# Patient Record
Sex: FEMALE | Race: WHITE | NOT HISPANIC OR LATINO | Employment: OTHER | URBAN - METROPOLITAN AREA
[De-identification: names, ages, dates, MRNs, and addresses within clinical notes are randomized per-mention and may not be internally consistent; named-entity substitution may affect disease eponyms.]

---

## 2017-07-11 ENCOUNTER — TRANSCRIBE ORDERS (OUTPATIENT)
Dept: ADMINISTRATIVE | Facility: HOSPITAL | Age: 82
End: 2017-07-11

## 2017-07-11 DIAGNOSIS — Z12.31 VISIT FOR SCREENING MAMMOGRAM: Primary | ICD-10-CM

## 2017-08-10 ENCOUNTER — HOSPITAL ENCOUNTER (OUTPATIENT)
Dept: MAMMOGRAPHY | Facility: HOSPITAL | Age: 82
Discharge: HOME | End: 2017-08-10
Admitting: INTERNAL MEDICINE

## 2017-08-10 DIAGNOSIS — Z12.31 VISIT FOR SCREENING MAMMOGRAM: ICD-10-CM

## 2017-08-10 PROCEDURE — G0202 SCR MAMMO BI INCL CAD: HCPCS

## 2017-08-10 PROCEDURE — 77063 BREAST TOMOSYNTHESIS BI: CPT | Performed by: RADIOLOGY

## 2017-08-10 PROCEDURE — 77063 BREAST TOMOSYNTHESIS BI: CPT

## 2017-08-10 PROCEDURE — G0202 SCR MAMMO BI INCL CAD: HCPCS | Performed by: RADIOLOGY

## 2018-06-28 ENCOUNTER — TRANSCRIBE ORDERS (OUTPATIENT)
Dept: ADMINISTRATIVE | Facility: HOSPITAL | Age: 83
End: 2018-06-28

## 2018-06-28 DIAGNOSIS — Z12.31 VISIT FOR SCREENING MAMMOGRAM: Primary | ICD-10-CM

## 2018-08-13 ENCOUNTER — HOSPITAL ENCOUNTER (OUTPATIENT)
Dept: MAMMOGRAPHY | Facility: HOSPITAL | Age: 83
Discharge: HOME | End: 2018-08-13
Admitting: INTERNAL MEDICINE

## 2018-08-13 DIAGNOSIS — Z12.31 VISIT FOR SCREENING MAMMOGRAM: ICD-10-CM

## 2018-08-13 PROCEDURE — 77067 SCR MAMMO BI INCL CAD: CPT

## 2018-08-13 PROCEDURE — 77063 BREAST TOMOSYNTHESIS BI: CPT

## 2018-08-13 PROCEDURE — 77067 SCR MAMMO BI INCL CAD: CPT | Performed by: RADIOLOGY

## 2018-08-13 PROCEDURE — 77063 BREAST TOMOSYNTHESIS BI: CPT | Performed by: RADIOLOGY

## 2019-07-29 ENCOUNTER — TRANSCRIBE ORDERS (OUTPATIENT)
Dept: ADMINISTRATIVE | Facility: HOSPITAL | Age: 84
End: 2019-07-29

## 2019-07-29 DIAGNOSIS — Z12.31 VISIT FOR SCREENING MAMMOGRAM: Primary | ICD-10-CM

## 2019-09-24 ENCOUNTER — HOSPITAL ENCOUNTER (OUTPATIENT)
Dept: MAMMOGRAPHY | Facility: HOSPITAL | Age: 84
Discharge: HOME | End: 2019-09-24
Admitting: INTERNAL MEDICINE

## 2019-09-24 DIAGNOSIS — Z12.31 VISIT FOR SCREENING MAMMOGRAM: ICD-10-CM

## 2019-09-24 PROCEDURE — 77067 SCR MAMMO BI INCL CAD: CPT

## 2019-09-24 PROCEDURE — 77063 BREAST TOMOSYNTHESIS BI: CPT

## 2019-09-24 PROCEDURE — 77067 SCR MAMMO BI INCL CAD: CPT | Performed by: RADIOLOGY

## 2019-09-24 PROCEDURE — 77063 BREAST TOMOSYNTHESIS BI: CPT | Performed by: RADIOLOGY

## 2020-08-13 ENCOUNTER — TRANSCRIBE ORDERS (OUTPATIENT)
Dept: ADMINISTRATIVE | Facility: HOSPITAL | Age: 85
End: 2020-08-13

## 2020-08-13 DIAGNOSIS — Z12.31 VISIT FOR SCREENING MAMMOGRAM: Primary | ICD-10-CM

## 2020-10-03 ENCOUNTER — HOSPITAL ENCOUNTER (OUTPATIENT)
Dept: MAMMOGRAPHY | Facility: HOSPITAL | Age: 85
Discharge: HOME | End: 2020-10-03
Admitting: INTERNAL MEDICINE

## 2020-10-03 DIAGNOSIS — Z12.31 VISIT FOR SCREENING MAMMOGRAM: ICD-10-CM

## 2020-10-03 PROCEDURE — 77067 SCR MAMMO BI INCL CAD: CPT | Performed by: RADIOLOGY

## 2020-10-03 PROCEDURE — 77063 BREAST TOMOSYNTHESIS BI: CPT

## 2020-10-03 PROCEDURE — 77067 SCR MAMMO BI INCL CAD: CPT

## 2020-10-03 PROCEDURE — 77063 BREAST TOMOSYNTHESIS BI: CPT | Performed by: RADIOLOGY

## 2020-12-28 ENCOUNTER — APPOINTMENT (OUTPATIENT)
Dept: MAMMOGRAPHY | Facility: HOSPITAL | Age: 85
End: 2020-12-28

## 2021-01-04 ENCOUNTER — HOSPITAL ENCOUNTER (OUTPATIENT)
Dept: MAMMOGRAPHY | Facility: HOSPITAL | Age: 86
Discharge: HOME | End: 2021-01-04

## 2021-01-04 ENCOUNTER — HOSPITAL ENCOUNTER (OUTPATIENT)
Dept: ULTRASOUND IMAGING | Facility: HOSPITAL | Age: 86
Discharge: HOME | End: 2021-01-04

## 2021-01-04 ENCOUNTER — DOCUMENTATION (OUTPATIENT)
Dept: MAMMOGRAPHY | Facility: HOSPITAL | Age: 86
End: 2021-01-04

## 2021-01-04 DIAGNOSIS — R92.8 ABNORMAL MAMMOGRAM: ICD-10-CM

## 2021-01-04 PROCEDURE — 77065 DX MAMMO INCL CAD UNI: CPT | Performed by: RADIOLOGY

## 2021-01-04 PROCEDURE — G0279 TOMOSYNTHESIS, MAMMO: HCPCS | Performed by: RADIOLOGY

## 2021-01-04 PROCEDURE — 76642 ULTRASOUND BREAST LIMITED: CPT | Performed by: RADIOLOGY

## 2021-01-04 PROCEDURE — G0279 TOMOSYNTHESIS, MAMMO: HCPCS

## 2021-01-04 PROCEDURE — 76642 ULTRASOUND BREAST LIMITED: CPT

## 2021-01-04 PROCEDURE — 77065 DX MAMMO INCL CAD UNI: CPT

## 2021-01-04 NOTE — PROGRESS NOTES
Antithrombolic/blood thinner clearance form successfully faxed to Karen Quesada, DO at pt request.

## 2021-01-06 ENCOUNTER — TELEPHONE (OUTPATIENT)
Dept: MAMMOGRAPHY | Facility: HOSPITAL | Age: 86
End: 2021-01-06

## 2021-01-07 ENCOUNTER — TRANSCRIBE ORDERS (OUTPATIENT)
Dept: MAMMOGRAPHY | Facility: HOSPITAL | Age: 86
End: 2021-01-07

## 2021-01-07 DIAGNOSIS — R92.8 ABNORMAL MAMMOGRAM: Primary | ICD-10-CM

## 2021-01-15 ENCOUNTER — TELEPHONE (OUTPATIENT)
Dept: MAMMOGRAPHY | Facility: HOSPITAL | Age: 86
End: 2021-01-15

## 2021-01-15 NOTE — TELEPHONE ENCOUNTER
Returned patient call; questions answered regarding medication instructions prior to & after procedure scheduled on 1/25/21. Questions answered, support given. Pt is to contact prescribing provider with any additional medication questions. Pt verbalized understanding.

## 2021-01-25 ENCOUNTER — HOSPITAL ENCOUNTER (OUTPATIENT)
Dept: ULTRASOUND IMAGING | Facility: HOSPITAL | Age: 86
Discharge: HOME | End: 2021-01-25

## 2021-01-25 ENCOUNTER — HOSPITAL ENCOUNTER (OUTPATIENT)
Dept: MAMMOGRAPHY | Facility: HOSPITAL | Age: 86
Discharge: HOME | End: 2021-01-25

## 2021-01-25 DIAGNOSIS — R92.8 ABNORMAL MAMMOGRAM: ICD-10-CM

## 2021-01-25 PROCEDURE — 88341 IMHCHEM/IMCYTCHM EA ADD ANTB: CPT | Performed by: INTERNAL MEDICINE

## 2021-01-25 PROCEDURE — 88305 TISSUE EXAM BY PATHOLOGIST: CPT | Performed by: INTERNAL MEDICINE

## 2021-01-25 PROCEDURE — 88342 IMHCHEM/IMCYTCHM 1ST ANTB: CPT | Performed by: INTERNAL MEDICINE

## 2021-01-25 PROCEDURE — A4648 IMPLANTABLE TISSUE MARKER: HCPCS

## 2021-01-25 PROCEDURE — 88360 TUMOR IMMUNOHISTOCHEM/MANUAL: CPT | Performed by: INTERNAL MEDICINE

## 2021-01-25 PROCEDURE — 25010000003 LIDOCAINE 1 % SOLUTION: Performed by: RADIOLOGY

## 2021-01-25 PROCEDURE — 77065 DX MAMMO INCL CAD UNI: CPT | Performed by: RADIOLOGY

## 2021-01-25 PROCEDURE — 19083 BX BREAST 1ST LESION US IMAG: CPT | Performed by: RADIOLOGY

## 2021-01-25 RX ORDER — LIDOCAINE HYDROCHLORIDE 10 MG/ML
5 INJECTION, SOLUTION INFILTRATION; PERINEURAL ONCE
Status: COMPLETED | OUTPATIENT
Start: 2021-01-25 | End: 2021-01-25

## 2021-01-25 RX ORDER — LIDOCAINE HYDROCHLORIDE AND EPINEPHRINE 10; 10 MG/ML; UG/ML
10 INJECTION, SOLUTION INFILTRATION; PERINEURAL ONCE
Status: COMPLETED | OUTPATIENT
Start: 2021-01-25 | End: 2021-01-25

## 2021-01-25 RX ADMIN — LIDOCAINE HYDROCHLORIDE 2 ML: 10 INJECTION, SOLUTION INFILTRATION; PERINEURAL at 15:01

## 2021-01-25 RX ADMIN — LIDOCAINE HYDROCHLORIDE,EPINEPHRINE BITARTRATE 9 ML: 10; .01 INJECTION, SOLUTION INFILTRATION; PERINEURAL at 15:02

## 2021-01-28 ENCOUNTER — TELEPHONE (OUTPATIENT)
Dept: MAMMOGRAPHY | Facility: HOSPITAL | Age: 86
End: 2021-01-28

## 2021-01-28 LAB
CYTO UR: NORMAL
LAB AP CASE REPORT: NORMAL
LAB AP CLINICAL INFORMATION: NORMAL
LAB AP DIAGNOSIS COMMENT: NORMAL
LAB AP SPECIAL STAINS: NORMAL
PATH REPORT.FINAL DX SPEC: NORMAL
PATH REPORT.GROSS SPEC: NORMAL

## 2021-01-28 NOTE — TELEPHONE ENCOUNTER
Patient notified of appointment with Dr Bailey for surgical consult - 2.10.21 @ [id number]/[id number].  Pt given office contact & location information. Told to bring photo ID, list of prescription & OTC medications, insurance information, must wear a mask & can bring one person for support also wearing a mask. Reviewed what would be discussed at surgical consult visit, including detailed explanation of pathology report & imaging reports; treatment options & pros/cons, availability of nurse navigator. Patient encouraged to call back or contact NIMISHA Espino RN, OCN, Breast Navigator, with any questions or concerns. Pt information sent to NIMISHA Espino RN, OCN, Breast Navigator for evaluation & referral for possible genetic counseling. Breast cancer information packet offered and accepted. Patient verbalized understanding.

## 2021-01-28 NOTE — TELEPHONE ENCOUNTER
Referring provider's office notified pathology returned as cancer & patient will be notified. Pt notified of pathology results and recommendation. Verbalizes understanding. Denies discomfort. Denies signs and symptoms of infection.   Patient desires Dr Bailey for surgical consult. Patient will be notified of appointment. Patient verbalized understanding.

## 2021-02-11 ENCOUNTER — TRANSCRIBE ORDERS (OUTPATIENT)
Dept: MAMMOGRAPHY | Facility: HOSPITAL | Age: 86
End: 2021-02-11

## 2021-02-11 ENCOUNTER — NURSE NAVIGATOR (OUTPATIENT)
Dept: OTHER | Facility: HOSPITAL | Age: 86
End: 2021-02-11

## 2021-02-11 DIAGNOSIS — C50.411 MALIGNANT NEOPLASM OF UPPER-OUTER QUADRANT OF RIGHT BREAST IN FEMALE, ESTROGEN RECEPTOR POSITIVE (HCC): Primary | ICD-10-CM

## 2021-02-11 DIAGNOSIS — Z17.0 MALIGNANT NEOPLASM OF UPPER-OUTER QUADRANT OF RIGHT BREAST IN FEMALE, ESTROGEN RECEPTOR POSITIVE (HCC): Primary | ICD-10-CM

## 2021-02-11 DIAGNOSIS — R92.8 ABNORMAL MAMMOGRAM: Primary | ICD-10-CM

## 2021-02-11 NOTE — PROGRESS NOTES
Saw patient and son with Dr. OLIVERA regarding her new R Stage 1A LG IDC ER/HI positive Her 2 patricia negative breast cancer. Path report and surgical options discussed. MRI will be ordered. SOZO completed. Educational material provided and discussed. All questions were answered and she will call with any new concerns.

## 2021-02-15 ENCOUNTER — HOSPITAL ENCOUNTER (OUTPATIENT)
Dept: MRI IMAGING | Facility: HOSPITAL | Age: 86
Discharge: HOME | End: 2021-02-15

## 2021-02-15 DIAGNOSIS — C50.411 MALIGNANT NEOPLASM OF UPPER-OUTER QUADRANT OF RIGHT FEMALE BREAST (HCC): ICD-10-CM

## 2021-02-15 LAB — CREAT BLDA-MCNC: 1.6 MG/DL (ref 0.6–1.3)

## 2021-02-15 PROCEDURE — 82565 ASSAY OF CREATININE: CPT

## 2021-02-16 ENCOUNTER — APPOINTMENT (OUTPATIENT)
Dept: MRI IMAGING | Facility: HOSPITAL | Age: 86
End: 2021-02-16

## 2021-02-18 ENCOUNTER — TRANSCRIBE ORDERS (OUTPATIENT)
Dept: PREADMISSION TESTING | Facility: HOSPITAL | Age: 86
End: 2021-02-18

## 2021-02-18 ENCOUNTER — TRANSCRIBE ORDERS (OUTPATIENT)
Dept: ADMINISTRATIVE | Facility: HOSPITAL | Age: 86
End: 2021-02-18

## 2021-02-18 DIAGNOSIS — C50.411 MALIGNANT NEOPLASM OF UPPER-OUTER QUADRANT OF RIGHT FEMALE BREAST, UNSPECIFIED ESTROGEN RECEPTOR STATUS (HCC): Primary | ICD-10-CM

## 2021-02-19 ENCOUNTER — PRE-ADMISSION TESTING (OUTPATIENT)
Dept: PREADMISSION TESTING | Facility: HOSPITAL | Age: 86
End: 2021-02-19

## 2021-02-19 ENCOUNTER — APPOINTMENT (OUTPATIENT)
Dept: MAMMOGRAPHY | Facility: HOSPITAL | Age: 86
End: 2021-02-19

## 2021-02-19 LAB
ALBUMIN SERPL-MCNC: 4.5 G/DL (ref 3.5–5.2)
ALBUMIN/GLOB SERPL: 1.9 G/DL
ALP SERPL-CCNC: 95 U/L (ref 39–117)
ALT SERPL W P-5'-P-CCNC: 11 U/L (ref 1–33)
ANION GAP SERPL CALCULATED.3IONS-SCNC: 12 MMOL/L (ref 5–15)
AST SERPL-CCNC: 17 U/L (ref 1–32)
BILIRUB SERPL-MCNC: 0.6 MG/DL (ref 0–1.2)
BUN SERPL-MCNC: 22 MG/DL (ref 8–23)
BUN/CREAT SERPL: 12.2 (ref 7–25)
CALCIUM SPEC-SCNC: 9.8 MG/DL (ref 8.6–10.5)
CHLORIDE SERPL-SCNC: 98 MMOL/L (ref 98–107)
CO2 SERPL-SCNC: 24 MMOL/L (ref 22–29)
CREAT SERPL-MCNC: 1.81 MG/DL (ref 0.57–1)
DEPRECATED RDW RBC AUTO: 47.7 FL (ref 37–54)
ERYTHROCYTE [DISTWIDTH] IN BLOOD BY AUTOMATED COUNT: 14.1 % (ref 12.3–15.4)
GFR SERPL CREATININE-BSD FRML MDRD: 27 ML/MIN/1.73
GLOBULIN UR ELPH-MCNC: 2.4 GM/DL
GLUCOSE SERPL-MCNC: 111 MG/DL (ref 65–99)
HCT VFR BLD AUTO: 41.9 % (ref 34–46.6)
HGB BLD-MCNC: 13.3 G/DL (ref 12–15.9)
MCH RBC QN AUTO: 29.4 PG (ref 26.6–33)
MCHC RBC AUTO-ENTMCNC: 31.7 G/DL (ref 31.5–35.7)
MCV RBC AUTO: 92.5 FL (ref 79–97)
PLATELET # BLD AUTO: 296 10*3/MM3 (ref 140–450)
PMV BLD AUTO: 11.1 FL (ref 6–12)
POTASSIUM SERPL-SCNC: 4.2 MMOL/L (ref 3.5–5.2)
PROT SERPL-MCNC: 6.9 G/DL (ref 6–8.5)
QT INTERVAL: 366 MS
QTC INTERVAL: 447 MS
RBC # BLD AUTO: 4.53 10*6/MM3 (ref 3.77–5.28)
SODIUM SERPL-SCNC: 134 MMOL/L (ref 136–145)
WBC # BLD AUTO: 8.4 10*3/MM3 (ref 3.4–10.8)

## 2021-02-19 PROCEDURE — 93005 ELECTROCARDIOGRAM TRACING: CPT

## 2021-02-19 PROCEDURE — 36415 COLL VENOUS BLD VENIPUNCTURE: CPT

## 2021-02-19 PROCEDURE — 80053 COMPREHEN METABOLIC PANEL: CPT

## 2021-02-19 PROCEDURE — 93010 ELECTROCARDIOGRAM REPORT: CPT | Performed by: INTERNAL MEDICINE

## 2021-02-19 PROCEDURE — 85027 COMPLETE CBC AUTOMATED: CPT

## 2021-02-19 NOTE — DISCHARGE INSTRUCTIONS
The following information and instructions were given:    Nothing to eat or drink after midnight except sips of water with routine prescribed medication (except blood thinner, certain blood pressure medications, diabetes, or weight reducing medication) unless otherwise instructed by your physician.  Do not eat, drink, smoke or chew gum after midnight the night before surgery. This also includes no mints.    EXCEPTION: ERAS patients Patient instructed to drink 20 ounces (or until full) of Gatorade and it needs to be completed 1 hour before given arrival time on the day of surgery. (NO RED Gatorade)    Patient verbalized understanding.      DO NOT shave for two days before your procedure.  Do not wear makeup.      DO NOT wear fingernail polish (gel/regular) and/or acrylic/artificial nails on the day of surgery.   If a patient had recent manicure and would rather not remove polish or artificial nails, then the minimum requirement is that the polish/artificial nails must be removed from the middle finger on each hand.      If patient was having surgery on an upper extremity, then the patient was instructed that fingernail polish/artificial fingernails must be removed for surgery.  NO EXCEPTIONS.      If patient was having surgery on a lower extremity, then the patient was instructed that toenail polish on both extremities must be removed for surgery.  NO EXCEPTIONS.    Remove all jewelry (advised to go to jeweler if unable to remove).  Jewelry especially rings can no longer be taped for surgery.    Leave anything you consider valuable at home.      Bring the following with you (if applicable)   -picture ID and insurance cards   -Co-pay/deductible required by insurance   -Medications in the original bottles (not a list) including all over-the-counter meds     Education booklet, brochure, and/or given to patient.    Patient must have a  for transportation home after procedure.  It must be an   adult that will take  responsibility for care for 24 hours after surgery.

## 2021-02-21 ENCOUNTER — APPOINTMENT (OUTPATIENT)
Dept: PREADMISSION TESTING | Facility: HOSPITAL | Age: 86
End: 2021-02-21

## 2021-02-21 PROCEDURE — C9803 HOPD COVID-19 SPEC COLLECT: HCPCS

## 2021-02-21 PROCEDURE — U0004 COV-19 TEST NON-CDC HGH THRU: HCPCS

## 2021-02-22 LAB — SARS-COV-2 RNA RESP QL NAA+PROBE: NOT DETECTED

## 2021-02-23 ENCOUNTER — HOSPITAL ENCOUNTER (OUTPATIENT)
Dept: ULTRASOUND IMAGING | Facility: HOSPITAL | Age: 86
Discharge: HOME | End: 2021-02-23

## 2021-02-23 ENCOUNTER — HOSPITAL ENCOUNTER (OUTPATIENT)
Dept: MAMMOGRAPHY | Facility: HOSPITAL | Age: 86
Discharge: HOME | End: 2021-02-23

## 2021-02-23 ENCOUNTER — HOSPITAL ENCOUNTER (OUTPATIENT)
Dept: NUCLEAR MEDICINE | Facility: HOSPITAL | Age: 86
Discharge: HOME | End: 2021-02-23

## 2021-02-23 ENCOUNTER — LAB REQUISITION (OUTPATIENT)
Dept: LAB | Facility: HOSPITAL | Age: 86
End: 2021-02-23

## 2021-02-23 DIAGNOSIS — C50.411 MALIGNANT NEOPLASM OF UPPER-OUTER QUADRANT OF RIGHT FEMALE BREAST, UNSPECIFIED ESTROGEN RECEPTOR STATUS (HCC): ICD-10-CM

## 2021-02-23 DIAGNOSIS — R92.8 ABNORMAL MAMMOGRAM: ICD-10-CM

## 2021-02-23 DIAGNOSIS — C50.411 MALIGNANT NEOPLASM OF UPPER-OUTER QUADRANT OF RIGHT FEMALE BREAST (HCC): ICD-10-CM

## 2021-02-23 PROCEDURE — 88305 TISSUE EXAM BY PATHOLOGIST: CPT | Performed by: SURGERY

## 2021-02-23 PROCEDURE — 0 TECHNETIUM FILTERED SULFUR COLLOID: Performed by: SURGERY

## 2021-02-23 PROCEDURE — 38792 RA TRACER ID OF SENTINL NODE: CPT

## 2021-02-23 PROCEDURE — 77065 DX MAMMO INCL CAD UNI: CPT | Performed by: RADIOLOGY

## 2021-02-23 PROCEDURE — 88307 TISSUE EXAM BY PATHOLOGIST: CPT | Performed by: SURGERY

## 2021-02-23 PROCEDURE — A9541 TC99M SULFUR COLLOID: HCPCS | Performed by: SURGERY

## 2021-02-23 PROCEDURE — C1819 TISSUE LOCALIZATION-EXCISION: HCPCS

## 2021-02-23 PROCEDURE — 76098 X-RAY EXAM SURGICAL SPECIMEN: CPT

## 2021-02-23 PROCEDURE — 19285 PERQ DEV BREAST 1ST US IMAG: CPT | Performed by: RADIOLOGY

## 2021-02-23 PROCEDURE — 25010000003 LIDOCAINE 1 % SOLUTION: Performed by: SURGERY

## 2021-02-23 PROCEDURE — 76098 X-RAY EXAM SURGICAL SPECIMEN: CPT | Performed by: RADIOLOGY

## 2021-02-23 PROCEDURE — 88342 IMHCHEM/IMCYTCHM 1ST ANTB: CPT | Performed by: SURGERY

## 2021-02-23 RX ORDER — LIDOCAINE HYDROCHLORIDE 10 MG/ML
5 INJECTION, SOLUTION INFILTRATION; PERINEURAL ONCE
Status: COMPLETED | OUTPATIENT
Start: 2021-02-23 | End: 2021-02-23

## 2021-02-23 RX ADMIN — TECHNETIUM TC 99M SULFUR COLLOID 1 DOSE: KIT at 12:37

## 2021-02-23 RX ADMIN — LIDOCAINE HYDROCHLORIDE 4 ML: 10 INJECTION, SOLUTION INFILTRATION; PERINEURAL at 08:25

## 2021-03-01 LAB
CYTO UR: NORMAL
LAB AP CASE REPORT: NORMAL
LAB AP CLINICAL INFORMATION: NORMAL
LAB AP DIAGNOSIS COMMENT: NORMAL
PATH REPORT.FINAL DX SPEC: NORMAL
PATH REPORT.GROSS SPEC: NORMAL

## 2021-03-02 NOTE — PROGRESS NOTES
"  Subjective     PROBLEM LIST:  1. 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  Pathology showed 5 mm residual tumor (7 mm size per imaging), grade 2.  0/1 SLN involved.  B) anastrozole started March 2021.    CHIEF COMPLAINT: breast cancer      HISTORY OF PRESENT ILLNESS:  The patient is a 85 y.o. female, referred for evaluation of a recently diagnosed breast cancer.    She presented with an abnormality on screening mammogram.  She underwent a lumpectomy.  She says she has done very well with the surgery.  She did not require any pain medication.  She has no complaints today.    She lives here in [place] by herself.  She has a son who lives here in [facility] and another in [place].  She is in good shape and independent.    REVIEW OF SYSTEMS:  A 14 point review of systems was performed and is negative except as noted above.    History reviewed. No pertinent past medical history.            Objective     /77   Pulse 93   Temp 97.4 °F (36.3 °C)   Resp 18   Ht 160 cm (63\")   Wt 61.2 kg (135 lb)   SpO2 98%   BMI 23.91 kg/m²   Performance Status:0  ECOG score: 0           General: well appearing female in no acute distress  Neuro: alert and oriented  HEENT: sclerae anicteric  Extremities: no lower extremity edema  Skin: no rashes, lesions, bruising, or petechiae  Psych: mood and affect appropriate    Lab Results   Component Value Date    WBC 8.40 02/19/2021    HGB 13.3 02/19/2021    HCT 41.9 02/19/2021    MCV 92.5 02/19/2021     02/19/2021     Lab Results   Component Value Date    GLUCOSE 111 (H) 02/19/2021    BUN 22 02/19/2021    CREATININE 1.81 (H) 02/19/2021    EGFRIFNONA 27 (L) 02/19/2021    BCR 12.2 02/19/2021    K 4.2 02/19/2021    CO2 24.0 02/19/2021    CALCIUM 9.8 02/19/2021    ALBUMIN 4.50 02/19/2021    AST 17 02/19/2021    ALT 11 02/19/2021                 Assessment/Plan     Nataliya Neal is a 85 y.o. year old female with stage " IA ER+ Her2 negative IDC of the right breast.    She has a small low risk tumor and I would not recommend IV chemotherapy.  I also think she could easily forego adjuvant radiotherapy.  We did discuss adjuvant endocrine therapy with an aromatase inhibitor.  We reviewed the potential side effects of anastrozole including hot flashes, vaginal dryness, joint pain, decrease in bone density, and mood or sleep disturbance.    Given that she has a low risk cancer, the endocrine therapy could certainly be stopped if she has significant side effects.  She would like to try this.  I will send a prescription for anastrozole to her pharmacy.  She can begin it at any time.  I will have her return in about 3 months to see how she is feeling with it.  If she is going to stay on the medicine long-term we should get a baseline bone density scan.  If she is doing well then we will see her every 6 months.             A total greater than 45 mins minutes was spent in face to face patient time, examination, counseling, charting, reviewing test results, and reviewing outside records.    Yola Mosley MD    3/3/2021

## 2021-03-03 ENCOUNTER — CONSULT (OUTPATIENT)
Dept: ONCOLOGY | Facility: CLINIC | Age: 86
End: 2021-03-03

## 2021-03-03 VITALS
TEMPERATURE: 97.4 F | WEIGHT: 135 LBS | OXYGEN SATURATION: 98 % | BODY MASS INDEX: 23.92 KG/M2 | SYSTOLIC BLOOD PRESSURE: 137 MMHG | HEART RATE: 93 BPM | DIASTOLIC BLOOD PRESSURE: 77 MMHG | RESPIRATION RATE: 18 BRPM | HEIGHT: 63 IN

## 2021-03-03 DIAGNOSIS — Z17.0 MALIGNANT NEOPLASM OF RIGHT BREAST IN FEMALE, ESTROGEN RECEPTOR POSITIVE, UNSPECIFIED SITE OF BREAST (HCC): Primary | ICD-10-CM

## 2021-03-03 DIAGNOSIS — C50.911 MALIGNANT NEOPLASM OF RIGHT BREAST IN FEMALE, ESTROGEN RECEPTOR POSITIVE, UNSPECIFIED SITE OF BREAST (HCC): Primary | ICD-10-CM

## 2021-03-03 PROCEDURE — 99204 OFFICE O/P NEW MOD 45 MIN: CPT | Performed by: INTERNAL MEDICINE

## 2021-03-03 RX ORDER — ANASTROZOLE 1 MG/1
1 TABLET ORAL DAILY
Qty: 30 TABLET | Refills: 11 | Status: SHIPPED | OUTPATIENT
Start: 2021-03-03

## 2021-03-03 RX ORDER — LISINOPRIL AND HYDROCHLOROTHIAZIDE 20; 12.5 MG/1; MG/1
TABLET ORAL
COMMUNITY
Start: 2021-02-12

## 2021-03-03 RX ORDER — ATORVASTATIN CALCIUM 40 MG/1
TABLET, FILM COATED ORAL
COMMUNITY
Start: 2021-02-07

## 2021-03-25 ENCOUNTER — OFFICE VISIT (OUTPATIENT)
Dept: RADIATION ONCOLOGY | Facility: HOSPITAL | Age: 86
End: 2021-03-25

## 2021-03-25 ENCOUNTER — HOSPITAL ENCOUNTER (OUTPATIENT)
Dept: RADIATION ONCOLOGY | Facility: HOSPITAL | Age: 86
Setting detail: RADIATION/ONCOLOGY SERIES
Discharge: HOME | End: 2021-03-25

## 2021-03-25 VITALS
RESPIRATION RATE: 16 BRPM | HEART RATE: 82 BPM | DIASTOLIC BLOOD PRESSURE: 74 MMHG | WEIGHT: 137 LBS | OXYGEN SATURATION: 93 % | HEIGHT: 63 IN | TEMPERATURE: 96.9 F | SYSTOLIC BLOOD PRESSURE: 125 MMHG | BODY MASS INDEX: 24.27 KG/M2

## 2021-03-25 DIAGNOSIS — Z17.0 MALIGNANT NEOPLASM OF UPPER-OUTER QUADRANT OF RIGHT BREAST IN FEMALE, ESTROGEN RECEPTOR POSITIVE (HCC): Primary | ICD-10-CM

## 2021-03-25 DIAGNOSIS — C50.411 MALIGNANT NEOPLASM OF UPPER-OUTER QUADRANT OF RIGHT BREAST IN FEMALE, ESTROGEN RECEPTOR POSITIVE (HCC): Primary | ICD-10-CM

## 2021-03-25 NOTE — PROGRESS NOTES
CONSULTATION NOTE    NAME:      Nataliya Neal  :                                                          1935  DATE OF CONSULTATION:                       21  REQUESTING PHYSICIAN:                   Luisa Bailey MD  REASON FOR CONSULTATION:           Cancer Staging  Malignant neoplasm of right breast in female, estrogen receptor positive (CMS/HCC)  Staging form: Breast, AJCC 8th Edition  - Pathologic: pT1b, pN0, cM0, ER+, IA+, HER2- - Signed by Yola Mosley MD on 3/3/2021           BRIEF HISTORY:  Nataliya Neal  is a very pleasant 85 y.o. female  who presented with abnormal screening mammogram.  She had a 7 mm irregular mass in the right breast 10 o'clock position.  Ultrasound-guided biopsy was positive for invasive ductal carcinoma, low-grade, ER/IA positive HER-2/patricia negative.  She underwent right lumpectomy on 2021 and was found to have a 5 mm residual tumor, grade 2 0/1 lymph node was positive.  Margins were negative by 8 mm.  There was no DCIS present.  She was started on anastrozole by Dr. Yola Mosley in 2021.  Ms. Neal is here to discuss postoperative radiation.  Ms. Neal has no complaints today.      Age at menarche:  14  Age at menopause:  unknown  Hormone replacement therapy:  no  Personal history of breast cancer:  no  Family history of breast cancer:  no  Radiation to chest before age of 31:  no  Age of first live birth:  24  BMI:  Body mass index is 24.27 kg/m².  ETOH intake:  Scotch daily        Social History     Substance and Sexual Activity   Alcohol Use Yes   • Alcohol/week: 7.0 standard drinks   • Types: 7 Shots of liquor per week           No Known Allergies    Social History     Tobacco Use   • Smoking status: Former Smoker   Substance Use Topics   • Alcohol use: Yes     Alcohol/week: 7.0 standard drinks     Types: 7 Shots of liquor per week   • Drug use: Not on file         Past Medical History:   Diagnosis Date   • Malignant neoplasm of right breast in female,  "estrogen receptor positive (CMS/HCC) 3/3/2021       family history is not on file.     Past Surgical History:   Procedure Laterality Date   • HYSTERECTOMY      age 45   • OOPHORECTOMY          Review of Systems   All other systems reviewed and are negative.          Objective   VITAL SIGNS:   Vitals:    03/25/21 1032   BP: 125/74   Pulse: 82   Resp: 16   Temp: 96.9 °F (36.1 °C)   TempSrc: Temporal   SpO2: 93%  Comment: RA   Weight: 62.1 kg (137 lb)   Height: 160 cm (62.99\")   PainSc: 0-No pain        KPS       90%    Physical Exam  Vitals reviewed.   Constitutional:       Appearance: Normal appearance.   Eyes:      Extraocular Movements: Extraocular movements intact.   Cardiovascular:      Rate and Rhythm: Normal rate and regular rhythm.      Pulses: Normal pulses.      Heart sounds: Normal heart sounds.   Musculoskeletal:      Cervical back: Neck supple.   Neurological:      Mental Status: She is alert.     Breast exam reveals a well-healed surgical incision in the upper outer quadrant of the right breast.  There are no masses or suspicious lesions in either breast and no axillary adenopathy bilaterally.         The following portions of the patient's history were reviewed and updated as appropriate: allergies, current medications, past family history, past medical history, past social history, past surgical history and problem list.    Assessment      IMPRESSION:     Nataliya Neal  is an 85 y.o. female  who underwent right lumpectomy on 2/23/2021 and was found to have a 5 mm residual tumor, grade 2 0/1 lymph node was positive.  Margins were negative by 8 mm.  There was no DCIS present.  She was started on anastrozole by Dr. Yola Mosley in March 2021.  Ms. Neal is here to discuss postoperative radiation.  Ms. Neal has no complaints today.          RECOMMENDATIONS: We discussed the pros and cons, risks and benefits of postoperative radiation.  She is over 70 years old with a low-grade tumor and negative margins.  She " plans to take anastrozole.  Her risk of local recurrence is extremely low.  We would only improve that slightly with postoperative radiation.  After discussing treatment we decided the risks of treatment outweigh the benefit.  If Ms. Neal does have a recurrence we will be glad to see her back at any time.  Thank you very much for asking me to see this very pleasant patient.               Cinthya Whiteside MD      Errors in dictation may reflect use of voice recognition software and not all errors in transcription may have been detected prior to signing.

## 2021-06-09 ENCOUNTER — TELEPHONE (OUTPATIENT)
Dept: ONCOLOGY | Facility: CLINIC | Age: 86
End: 2021-06-09

## 2021-06-09 NOTE — TELEPHONE ENCOUNTER
Provider: HERNAN ANDERSON  Caller: JESSICA AGUILAR  Relationship to Patient: SON  Phone Number: [phone]  Reason for Call: PATIENT FELL AND INJURED HERSELF, AND THE DOCTOR WHO SAW HER WAS INQUIRING ABOUT TEMPORARILY STOPPING HER ANASTROZOLE TO ASSIST WITH THE BONE HEALING

## 2021-06-10 NOTE — TELEPHONE ENCOUNTER
I called Mr Neal yesterday and left a voicemail.  I called again today and spoke with him.  Patient has had 2 falls in the past two weeks and has compression fractures in her Tspine.  She is currently admitted at  and will eventually be discharged to [facility].  I explained per Dr Mosley that the anastrozole doesn't affect bone healing but that it does affect bone density.  He said she was taking a break from the medication right now while she was inpatient.  He will call us back when she is discharged to reschedule her appt.

## 2024-04-17 ENCOUNTER — OFFICE (OUTPATIENT)
Dept: URBAN - METROPOLITAN AREA CLINIC 4 | Facility: CLINIC | Age: 89
End: 2024-04-17
Payer: MEDICARE

## 2024-04-17 VITALS — SYSTOLIC BLOOD PRESSURE: 138 MMHG | DIASTOLIC BLOOD PRESSURE: 70 MMHG | HEIGHT: 63 IN | WEIGHT: 120 LBS

## 2024-04-17 DIAGNOSIS — R19.7 DIARRHEA, UNSPECIFIED: ICD-10-CM

## 2024-04-17 DIAGNOSIS — R14.3 FLATULENCE: ICD-10-CM

## 2024-04-17 PROCEDURE — 99204 OFFICE O/P NEW MOD 45 MIN: CPT | Performed by: NURSE PRACTITIONER

## 2024-10-14 NOTE — PROGRESS NOTES
[facility]    Encounter Date: 10/18/2024    Patient ID: Nataliya Neal is a 88 y.o. female.  : 1935     PCP: Freida Osullivan MD       Chief Complaint: Shortness of Breath      PROBLEM LIST:  Shortness of breath  Echo 2023: EF 60-65%, no hemodynamically significant VHD  Lower extremity edema  Hypertension  History of breast cancer  History of elevated creatinine  History of tobacco use, with cessation > 50 years ago    History of Present Illness: Nataliya Neal is a 88 y.o. female who presents to the cardiology office today by referral from the [facility] to be seen in consultation for edema. Approximately 1 month ago she was in the hospital with acute renal failure. During that time had lower extremity edema and hypertension. Since then, her creatinine has substantially improved, her blood pressure is controlled, and she only has trace edema. She denies any history of heart disease. Patient denies any chest pain, shortness of air, palpitations, orthopnea, edema, presyncope or syncope.      Past Medical History:   Past Medical History:   Diagnosis Date    Malignant neoplasm of right breast in female, estrogen receptor positive 3/3/2021       Past Surgical History:   Past Surgical History:   Procedure Laterality Date    HYSTERECTOMY      age 45    OOPHORECTOMY         Family History:   Family History   Problem Relation Age of Onset    BRCA 1/2 Neg Hx     Breast cancer Neg Hx     Colon cancer Neg Hx     Endometrial cancer Neg Hx     Ovarian cancer Neg Hx        Social History:   Social History     Socioeconomic History    Marital status:    Tobacco Use    Smoking status: Former   Vaping Use    Vaping status: Never Used   Substance and Sexual Activity    Alcohol use: Yes     Alcohol/week: 7.0 standard drinks of alcohol     Types: 7 Shots of liquor per week       Tobacco History:   Social History     Tobacco Use   Smoking Status Former   Smokeless Tobacco Not on file        Medications:     Current Outpatient Medications:     amLODIPine (NORVASC) 2.5 MG tablet, , Disp: , Rfl:     anastrozole (ARIMIDEX) 1 MG tablet, Take 1 tablet by mouth Daily., Disp: 30 tablet, Rfl: 11    atorvastatin (LIPITOR) 40 MG tablet, , Disp: , Rfl:     calcium acetate (PHOS BINDER,) 667 MG capsule capsule, , Disp: , Rfl:     DULoxetine (CYMBALTA) 20 MG capsule, Take 1 capsule by mouth., Disp: , Rfl:     furosemide (LASIX) 20 MG tablet, , Disp: , Rfl:     gabapentin (NEURONTIN) 100 MG capsule, Take 1 capsule by mouth., Disp: , Rfl:     levETIRAcetam (KEPPRA) 750 MG tablet, Take 1 tablet by mouth., Disp: , Rfl:     Lidocaine 4 %, Apply 1 patch topically to the appropriate area as directed., Disp: , Rfl:     lisinopril (PRINIVIL,ZESTRIL) 10 MG tablet, Take  by mouth., Disp: , Rfl:     melatonin 3 MG tablet, Take 2 tablets by mouth., Disp: , Rfl:     methocarbamol (ROBAXIN) 500 MG tablet, , Disp: , Rfl:     naloxone (NARCAN) 4 MG/0.1ML nasal spray, Administer 1 spray into the nostril(s) as directed by provider., Disp: , Rfl:     ondansetron ODT (ZOFRAN-ODT) 4 MG disintegrating tablet, Take 1 tablet by mouth Every 6 (Six) Hours As Needed., Disp: , Rfl:     oxyCODONE (ROXICODONE) 5 MG immediate release tablet, Take 1 tablet by mouth., Disp: , Rfl:     pantoprazole (PROTONIX) 40 MG EC tablet, Take 1 tablet by mouth Daily., Disp: , Rfl:     polycarbophil 625 MG tablet tablet, Take 2 tablets by mouth Daily., Disp: , Rfl:     polyethylene glycol (MIRALAX) 17 g packet, Take 17 g by mouth., Disp: , Rfl:     potassium chloride (MICRO-K) 10 MEQ CR capsule, , Disp: , Rfl:     Allergies:   No Known Allergies  The following portions of the patient's history were reviewed and updated as appropriate: allergies, current medications, past family history, past medical history, past social history, past surgical history and problem list.    Review of Systems   12 point ROS negative except for that listed in the HPI.        "  Objective:     /64   Pulse 79   Ht 160 cm (63\")   Wt 55.4 kg (122 lb 3.2 oz)   SpO2 98%   BMI 21.65 kg/m²      Physical Exam  Constitutional: Patient appears well-developed and well-nourished.   HENT: HEENT exam unremarkable.   Neck: Neck supple. No JVD present.  Cardiovascular: Normal rate, regular rhythm and normal heart sounds. No murmur heard.   2+ symmetric pulses.   Pulmonary/Chest: Breath sounds normal.  Musculoskeletal: Trace lower extremity edema  Neurological: Neurological exam unremarkable.   Vitals reviewed.    Data Review:   Lab Results   Component Value Date    WBC 7.64 09/12/2024    RBC 3.90 09/12/2024    HGB 11.6 09/12/2024    HCT 36.3 09/12/2024    MCV 93 09/12/2024     09/12/2024       Lab review 8/7/2024  CBC: WBC 8.4, Hgb 13.2, HCT 39.3,   CMP: GLU 92, BUN 45, creatinine 5.9 (H) ,  (L), K4.4, CL 97 (L), CO2 13 (L)     Creatinine 6/25/2024 was 1.3.     Lab review 9/12/2024  CBC: WBC 7.6, Hgb 11.6, HCT 36.3,   CMP: GLU 82, BUN 25, creatinine 1.68, all electrolytes within normal limits      ECG 12 Lead    Date/Time: 10/18/2024 2:22 PM  Performed by: Karen Paul PA-C    Authorized by: Karen Paul PA-C  Comparison: compared with previous ECG from 2/19/2021  Similar to previous ECG  Rhythm: sinus rhythm  Rate: normal  BPM: 79    Clinical impression: normal ECG         Assessment:      Diagnosis   1. Lower extremity edema in the setting of acute on chronic kidney disease, improved, normal LV function      2. Essential hypertension       3. Shortness of breath, improved.          Plan:   Patient with dyspnea on exertion and lower extremity edema in the setting of YANETH with a creatinine of 8.  After diuresis and blood pressure improvement, both lower extremity edema and shortness of breath have substantially improved.  Continue Lasix 20 mg daily.  Echocardiogram reviewed and she has normal ejection fraction and no hemodynamically significant valvular " heart disease.  No indication for further cardiac testing at this time.  Call our office for elevated blood pressure or worsening heart failure symptoms or development of angina.  Continue current medications.   FU in 3 MO, sooner as needed.  Thank you for allowing us to participate in the care of your patient.     Karen Paul PA-C functioning as scribe for Teresa Calle MD    I, Teresa Calle MD, personally performed the services described in this documentation as scribed by the above named individual in my presence, and it is both accurate and complete.  10/18/2024  15:03 EDT      Part of this note may be an electronic transcription/translation of spoken language to printed text using the Dragon Dictation System.

## 2024-10-18 ENCOUNTER — OFFICE VISIT (OUTPATIENT)
Dept: CARDIOLOGY | Facility: CLINIC | Age: 89
End: 2024-10-18
Payer: MEDICARE

## 2024-10-18 VITALS
OXYGEN SATURATION: 98 % | HEIGHT: 63 IN | HEART RATE: 79 BPM | BODY MASS INDEX: 21.65 KG/M2 | SYSTOLIC BLOOD PRESSURE: 120 MMHG | WEIGHT: 122.2 LBS | DIASTOLIC BLOOD PRESSURE: 64 MMHG

## 2024-10-18 DIAGNOSIS — R60.0 LOWER EXTREMITY EDEMA: Primary | ICD-10-CM

## 2024-10-18 DIAGNOSIS — R06.02 SHORTNESS OF BREATH: ICD-10-CM

## 2024-10-18 DIAGNOSIS — I10 ESSENTIAL HYPERTENSION: ICD-10-CM

## 2024-10-18 RX ORDER — POLYETHYLENE GLYCOL 3350 17 G/17G
17 POWDER, FOR SOLUTION ORAL
COMMUNITY

## 2024-10-18 RX ORDER — LISINOPRIL 10 MG/1
TABLET ORAL
COMMUNITY

## 2024-10-18 RX ORDER — LEVETIRACETAM 750 MG/1
750 TABLET ORAL
COMMUNITY
Start: 2021-09-07 | End: 2024-11-08

## 2024-10-18 RX ORDER — PANTOPRAZOLE SODIUM 40 MG/1
40 TABLET, DELAYED RELEASE ORAL DAILY
COMMUNITY
Start: 2024-07-01 | End: 2025-07-01

## 2024-10-18 RX ORDER — POTASSIUM CHLORIDE 750 MG/1
CAPSULE, EXTENDED RELEASE ORAL
COMMUNITY
Start: 2024-07-05

## 2024-10-18 RX ORDER — CALCIUM ACETATE 667 MG/1
CAPSULE ORAL
COMMUNITY
Start: 2024-08-23

## 2024-10-18 RX ORDER — AMLODIPINE BESYLATE 2.5 MG/1
TABLET ORAL
COMMUNITY
Start: 2024-09-02

## 2024-10-18 RX ORDER — FUROSEMIDE 20 MG
TABLET ORAL
COMMUNITY
Start: 2024-06-25

## 2024-10-18 RX ORDER — METHOCARBAMOL 500 MG/1
TABLET, FILM COATED ORAL
COMMUNITY
Start: 2024-08-23

## 2024-10-18 RX ORDER — LANOLIN ALCOHOL/MO/W.PET/CERES
6 CREAM (GRAM) TOPICAL
COMMUNITY

## 2024-10-18 RX ORDER — CALCIUM POLYCARBOPHIL 625 MG
1250 TABLET ORAL DAILY
COMMUNITY

## 2024-10-18 RX ORDER — ONDANSETRON 4 MG/1
4 TABLET, ORALLY DISINTEGRATING ORAL EVERY 6 HOURS PRN
COMMUNITY

## 2024-10-18 RX ORDER — DULOXETIN HYDROCHLORIDE 20 MG/1
20 CAPSULE, DELAYED RELEASE ORAL
COMMUNITY

## 2024-10-18 RX ORDER — LIDOCAINE 4 G/G
1 PATCH TOPICAL
COMMUNITY

## 2024-10-18 RX ORDER — GABAPENTIN 100 MG/1
100 CAPSULE ORAL
COMMUNITY
Start: 2024-08-14

## 2024-10-18 RX ORDER — OXYCODONE HYDROCHLORIDE 5 MG/1
5 TABLET ORAL
COMMUNITY
Start: 2024-08-14

## 2025-06-16 NOTE — PROGRESS NOTES
[facility] Cardiology    Encounter Date: 2025    Patient ID: Nataliya Neal is a 89 y.o. female.  : 1935     PCP: Freida Osullivan MD       Chief Complaint: Leg Swelling      PROBLEM LIST:  Shortness of breath  Echo 2023: EF 60-65%, no hemodynamically significant VHD  Lower extremity edema  Hypertension  History of breast cancer  History of elevated creatinine  History of tobacco use, with cessation > 50 years ago    History of Present Illness  Patient presents today for a follow-up with a history of  cardiac risk factors. Since last visit, patient has been doing well overall from a cardiovascular standpoint. She walks a mile everyday and is currently in assisted living. She is being followed by a [facility] specialist. Patient denies chest pain, shortness of breath, orthopnea, palpitations, edema, dizziness, and syncope.     No Known Allergies      Current Outpatient Medications:     amLODIPine (NORVASC) 2.5 MG tablet, , Disp: , Rfl:     anastrozole (ARIMIDEX) 1 MG tablet, Take 1 tablet by mouth Daily., Disp: 30 tablet, Rfl: 11    atorvastatin (LIPITOR) 40 MG tablet, , Disp: , Rfl:     calcium acetate (PHOS BINDER,) 667 MG capsule capsule, , Disp: , Rfl:     DULoxetine (CYMBALTA) 20 MG capsule, Take 1 capsule by mouth., Disp: , Rfl:     famotidine (PEPCID) 20 MG tablet, , Disp: , Rfl:     furosemide (LASIX) 20 MG tablet, , Disp: , Rfl:     gabapentin (NEURONTIN) 100 MG capsule, Take 1 capsule by mouth., Disp: , Rfl:     Lidocaine 4 %, Apply 1 patch topically to the appropriate area as directed., Disp: , Rfl:     lisinopril (PRINIVIL,ZESTRIL) 10 MG tablet, Take  by mouth., Disp: , Rfl:     melatonin 3 MG tablet, Take 2 tablets by mouth., Disp: , Rfl:     methocarbamol (ROBAXIN) 500 MG tablet, , Disp: , Rfl:     methylcellulose, Laxative, (CITRUCEL) 500 MG tablet tablet, Take 1 tablet by mouth Daily., Disp: , Rfl:     naloxone (NARCAN) 4 MG/0.1ML nasal spray, Administer  "1 spray into the nostril(s) as directed by provider., Disp: , Rfl:     ondansetron ODT (ZOFRAN-ODT) 4 MG disintegrating tablet, Take 1 tablet by mouth Every 6 (Six) Hours As Needed., Disp: , Rfl:     oxyCODONE (ROXICODONE) 5 MG immediate release tablet, Take 1 tablet by mouth., Disp: , Rfl:     pantoprazole (PROTONIX) 40 MG EC tablet, Take 1 tablet by mouth Daily., Disp: , Rfl:     polycarbophil 625 MG tablet tablet, Take 2 tablets by mouth Daily., Disp: , Rfl:     polyethylene glycol (MIRALAX) 17 g packet, Take 17 g by mouth., Disp: , Rfl:     potassium chloride (MICRO-K) 10 MEQ CR capsule, , Disp: , Rfl:     Thiamine Mononitrate 100 MG tablet, Take 1 tablet by mouth Daily., Disp: , Rfl:     levETIRAcetam (KEPPRA) 750 MG tablet, Take 1 tablet by mouth., Disp: , Rfl:     The following portions of the patient's history were reviewed and updated as appropriate: allergies, current medications, past family history, past medical history, past social history, past surgical history and problem list.    ROS  Review of Systems   14 point ROS negative except for that listed in the HPI.         Objective:     /68 (BP Location: Left arm, Patient Position: Sitting)   Pulse 78   Ht 160 cm (63\")   Wt 57.2 kg (126 lb)   SpO2 95%   BMI 22.32 kg/m²      Physical Exam  Constitutional: Patient appears well-developed and well-nourished.   HENT: HEENT exam unremarkable.   Neck: Neck supple. No JVD present. No carotid bruits.   Cardiovascular: Normal rate, regular rhythm and normal heart sounds. No murmur heard.   2+ symmetric pulses.   Pulmonary/Chest: Breath sounds normal. Does not exhibit tenderness.   Abdominal: Abdomen benign.   Musculoskeletal: Does not exhibit edema.   Neurological: Neurological exam unremarkable.   Vitals reviewed.    Data Review:   Lab Results   Component Value Date    WBC 5.85 04/28/2025    RBC 4.12 04/28/2025    HGB 12.8 04/28/2025    HCT 39.9 04/28/2025    MCV 97 04/28/2025     04/28/2025 "     Lab date: 09/12/2024  CMP: Glu 82, BUN 25, Creat 1.68, eGFR 29.3, Na 140, K 4.3, Cl 106, CO2 22, Ca 9.4, Alk Phos 63, AST 21, ALT 12       Procedures       Advance Care Planning   ACP discussion was held with the patient during this visit. Patient does not have an advance directive, declines further assistance.           Assessment:      Diagnosis Plan   1. Essential hypertension  Stable and asymptomatic. Continue on amlodipine 2.5 mg daily for hypertension. Continue on Lasix 20 mg daily for fluid retention. Continue on lisinopril 10 mg daily for hypertension.       2. Shortness of breath  Stable and asymptomatic.       3.       Lower extremity edema.                                                   Stable and asymptomatic. On Lasix 20 mg daily.      Plan:   Stable cardiac status. No current angina or CHF symptoms.   Continue current medications.   FU in 12 MO, sooner as needed.  Thank you for allowing us to participate in the care of your patient.     Scribed for Teresa Calle MD by Rox Guerrero. 6/20/2025  11:37 EDT    I, Teresa Calle MD, personally performed the services described in this documentation as scribed by the above named individual in my presence, and it is both accurate and complete.  6/20/2025  13:15 EDT      Please note that portions of this note may have been completed with a voice recognition program. Efforts were made to edit the dictations, but occasionally words are mistranscribed.

## 2025-06-20 ENCOUNTER — OFFICE VISIT (OUTPATIENT)
Dept: CARDIOLOGY | Facility: CLINIC | Age: 89
End: 2025-06-20
Payer: MEDICARE

## 2025-06-20 VITALS
SYSTOLIC BLOOD PRESSURE: 118 MMHG | DIASTOLIC BLOOD PRESSURE: 68 MMHG | WEIGHT: 126 LBS | OXYGEN SATURATION: 95 % | HEART RATE: 78 BPM | HEIGHT: 63 IN | BODY MASS INDEX: 22.32 KG/M2

## 2025-06-20 DIAGNOSIS — R06.02 SHORTNESS OF BREATH: ICD-10-CM

## 2025-06-20 DIAGNOSIS — I10 ESSENTIAL HYPERTENSION: Primary | ICD-10-CM

## 2025-06-20 DIAGNOSIS — R60.0 LOWER EXTREMITY EDEMA: ICD-10-CM

## 2025-06-20 PROCEDURE — 1159F MED LIST DOCD IN RCRD: CPT | Performed by: INTERNAL MEDICINE

## 2025-06-20 PROCEDURE — 99214 OFFICE O/P EST MOD 30 MIN: CPT | Performed by: INTERNAL MEDICINE

## 2025-06-20 PROCEDURE — 1160F RVW MEDS BY RX/DR IN RCRD: CPT | Performed by: INTERNAL MEDICINE

## 2025-06-20 RX ORDER — GAUZE BANDAGE 2" X 2"
100 BANDAGE TOPICAL DAILY
COMMUNITY

## 2025-06-20 RX ORDER — FAMOTIDINE 20 MG/1
TABLET, FILM COATED ORAL
COMMUNITY
Start: 2025-05-05